# Patient Record
Sex: FEMALE | Race: WHITE | NOT HISPANIC OR LATINO | Employment: OTHER | URBAN - METROPOLITAN AREA
[De-identification: names, ages, dates, MRNs, and addresses within clinical notes are randomized per-mention and may not be internally consistent; named-entity substitution may affect disease eponyms.]

---

## 2017-09-13 DIAGNOSIS — M25.562 PAIN IN BOTH KNEES, UNSPECIFIED CHRONICITY: Primary | ICD-10-CM

## 2017-09-13 DIAGNOSIS — M25.561 PAIN IN BOTH KNEES, UNSPECIFIED CHRONICITY: Primary | ICD-10-CM

## 2017-09-18 ENCOUNTER — OFFICE VISIT (OUTPATIENT)
Dept: ORTHOPEDICS | Facility: CLINIC | Age: 72
End: 2017-09-18
Payer: MEDICARE

## 2017-09-18 VITALS
DIASTOLIC BLOOD PRESSURE: 80 MMHG | BODY MASS INDEX: 39.51 KG/M2 | HEIGHT: 63 IN | HEART RATE: 64 BPM | SYSTOLIC BLOOD PRESSURE: 135 MMHG | WEIGHT: 223 LBS

## 2017-09-18 DIAGNOSIS — M25.561 ACUTE PAIN OF RIGHT KNEE: Primary | ICD-10-CM

## 2017-09-18 PROCEDURE — 99203 OFFICE O/P NEW LOW 30 MIN: CPT | Mod: 25,S$GLB,, | Performed by: ORTHOPAEDIC SURGERY

## 2017-09-18 PROCEDURE — 1125F AMNT PAIN NOTED PAIN PRSNT: CPT | Mod: S$GLB,,, | Performed by: ORTHOPAEDIC SURGERY

## 2017-09-18 PROCEDURE — 20610 DRAIN/INJ JOINT/BURSA W/O US: CPT | Mod: RT,S$GLB,, | Performed by: ORTHOPAEDIC SURGERY

## 2017-09-18 PROCEDURE — 1159F MED LIST DOCD IN RCRD: CPT | Mod: S$GLB,,, | Performed by: ORTHOPAEDIC SURGERY

## 2017-09-18 RX ORDER — ASPIRIN 325 MG
325 TABLET ORAL DAILY
COMMUNITY
End: 2018-12-14

## 2017-09-18 RX ORDER — CHOLECALCIFEROL (VITAMIN D3) 125 MCG
5000 CAPSULE ORAL DAILY
COMMUNITY

## 2017-09-18 RX ORDER — TITANIUM DIOXIDE, OCTINOXATE, ZINC OXIDE 4.61; 1.6; .78 G/40ML; G/40ML; G/40ML
500 CREAM TOPICAL DAILY
COMMUNITY

## 2017-09-18 RX ORDER — HYDROCHLOROTHIAZIDE 12.5 MG/1
12.5 TABLET ORAL DAILY
COMMUNITY
End: 2018-12-14

## 2017-09-18 RX ORDER — CETIRIZINE HYDROCHLORIDE 10 MG/1
10 TABLET ORAL DAILY
COMMUNITY

## 2017-09-18 RX ORDER — POLYETHYLENE GLYCOL 3350 17 G/17G
17 POWDER, FOR SOLUTION ORAL EVERY OTHER DAY
COMMUNITY

## 2017-09-18 RX ORDER — NAPROXEN 500 MG/1
500 TABLET ORAL NIGHTLY
COMMUNITY

## 2017-09-18 RX ORDER — TRIAMCINOLONE ACETONIDE 40 MG/ML
40 INJECTION, SUSPENSION INTRA-ARTICULAR; INTRAMUSCULAR
Status: DISCONTINUED | OUTPATIENT
Start: 2017-09-18 | End: 2017-09-18 | Stop reason: HOSPADM

## 2017-09-18 RX ORDER — AMOXICILLIN 500 MG
2 CAPSULE ORAL 2 TIMES DAILY
COMMUNITY

## 2017-09-18 RX ORDER — PREDNISOLONE ACETATE 10 MG/ML
1 SUSPENSION/ DROPS OPHTHALMIC 4 TIMES DAILY
COMMUNITY
End: 2018-12-14

## 2017-09-18 RX ORDER — BUSPIRONE HYDROCHLORIDE 15 MG/1
7.5 TABLET ORAL DAILY
COMMUNITY

## 2017-09-18 RX ORDER — CHOLECALCIFEROL (VITAMIN D3) 25 MCG
2000 TABLET ORAL DAILY
COMMUNITY

## 2017-09-18 RX ORDER — GUAIFENESIN 400 MG/1
400 TABLET ORAL 2 TIMES DAILY
COMMUNITY
End: 2018-12-14

## 2017-09-18 RX ORDER — CYANOCOBALAMIN (VITAMIN B-12) 250 MCG
250 TABLET ORAL DAILY
COMMUNITY

## 2017-09-18 RX ADMIN — TRIAMCINOLONE ACETONIDE 40 MG: 40 INJECTION, SUSPENSION INTRA-ARTICULAR; INTRAMUSCULAR at 10:09

## 2017-09-18 NOTE — PROCEDURES
Large Joint Aspiration/Injection  Date/Time: 9/18/2017 10:48 AM  Performed by: CHRISTIANA BOCANEGRA  Authorized by: CHRISTIANA BOCANEGRA     Consent Done?:  Yes (Verbal)  Indications:  Pain  Timeout: Prior to procedure the correct patient, procedure, and site was verified      Location:  Knee  Site:  R knee  Prep: Patient was prepped and draped in usual sterile fashion    Approach:  Anteromedial  Medications:  40 mg triamcinolone acetonide 40 mg/mL

## 2017-09-18 NOTE — LETTER
September 18, 2017      [facility]  [address] [place] [address]           [place] - Orthopedics  [address] [place] [address]  Phone: [phone]  Fax: [phone]          Patient: Marielena Connors   MR Number: [id number]   YOB: 1945   Date of Visit: 9/18/2017       Dear [facility]:    Thank you for referring Marielena Connors to me for evaluation. Attached you will find relevant portions of my assessment and plan of care.    If you have questions, please do not hesitate to call me. I look forward to following Marielena Connors along with you.    Sincerely,    Marcos Drummond MD    Enclosure  CC:  No Recipients    If you would like to receive this communication electronically, please contact [email or web address] or [phone] to request more information on [facility] access.    For providers and/or their staff who would like to refer a patient to [facility], please contact us through our one-stop-shop provider referral line, [facility], at [phone].    If you feel you have received this communication in error or would no longer like to receive these types of communications, please e-mail [email or web address]

## 2017-09-18 NOTE — PROGRESS NOTES
Past Medical History:   Diagnosis Date    A-fib     Arthritis     Cancer     basal and squamous    Hypertension     Reflux esophagitis        Past Surgical History:   Procedure Laterality Date    cataract Bilateral     HAND SURGERY Bilateral 2006    carpal tunnel    KNEE ARTHROSCOPY Bilateral 2000    KNEE SURGERY Left 2008    partial left knee arthroplasty       Current Outpatient Prescriptions   Medication Sig    aspirin 325 MG tablet Take 325 mg by mouth once daily.    biotin 5,000 mcg TbDL Take 5,000 mcg by mouth once daily.    busPIRone (BUSPAR) 15 MG tablet Take 7.5 mg by mouth once daily.    cetirizine (ZYRTEC) 10 MG tablet Take 10 mg by mouth once daily.    cranberry 400 mg Cap Take 500 mg by mouth once daily.    cyanocobalamin (VITAMIN B-12) 250 MCG tablet Take 250 mcg by mouth once daily.    fish oil-omega-3 fatty acids 300-1,000 mg capsule Take 2 g by mouth 2 (two) times daily.    guaifenesin (HUMIBID E) 400 mg Tab Take 400 mg by mouth 2 (two) times daily.    hydrochlorothiazide (HYDRODIURIL) 12.5 MG Tab Take 12.5 mg by mouth once daily.    linaclotide (LINZESS) 145 mcg Cap capsule Take 145 mcg by mouth 2 (two) times daily as needed.    naproxen (NAPROSYN) 500 MG tablet Take 500 mg by mouth every evening.    polyethylene glycol (GLYCOLAX) 17 gram PwPk Take 17 g by mouth every other day.    prednisoLONE acetate (PRED FORTE) 1 % DrpS 1 drop 4 (four) times daily.    ranitidine (ZANTAC) 150 MG tablet Take 150 mg by mouth 2 (two) times daily.    vitamin D 1000 units Tab Take 2,000 Units by mouth once daily.     No current facility-administered medications for this visit.        Review of patient's allergies indicates:   Allergen Reactions    Hydrocodone        History reviewed. No pertinent family history.    Social History     Social History    Marital status:      Spouse name: N/A    Number of children: N/A    Years of education: N/A     Occupational History    Not on  "file.     Social History Main Topics    Smoking status: Never Smoker    Smokeless tobacco: Never Used    Alcohol use No    Drug use: No    Sexual activity: Not on file     Other Topics Concern    Not on file     Social History Narrative    No narrative on file       Chief Complaint:   Chief Complaint   Patient presents with    Right Knee - Pain       Consulting Physician: [facility]*    History of present illness:    This is a 72 y.o. year old female who complains of right knee pain that she presented 8 out of 10 for approximately 1 month.  She is not sure that she had an injury but states she might have stepped in a hole in her yard.  She localizes the pain to both sides of the knee and behind the knee.  She states is worse with activities.    Review of Systems:    Constitution: Denies chills, fever, and sweats.  HENT: Denies headaches or blurry vision.  Cardiovascular: Denies chest pain or irregular heart beat.  Respiratory: Denies cough or shortness of breath.  Gastrointestinal: Denies abdominal pain, nausea, or vomiting.  Musculoskeletal:  Denies muscle cramps.  Neurological: Denies dizziness or focal weakness.  Psychiatric/Behavioral: Normal mental status.  Hematologic/Lymphatic: Denies bleeding problem or easy bruising/bleeding.  Skin: Denies rash or suspicious lesions.    Examination:    Vital Signs:    Vitals:    09/18/17 0951   BP: 135/80   Pulse: 64   Weight: 101.2 kg (223 lb)   Height: 5' 3.25" (1.607 m)   PainSc:   8   PainLoc: Knee       Body mass index is 39.19 kg/m².    This a well-developed, well nourished patient in no acute distress.    Alert and oriented x 3 and cooperative to examination.       Physical Exam: Right Knee Exam    Gait   Antalgic    Skin  Rash:   None  Scars:   None    Inspection  Erythema:  None  Bruising:  None  Effusion:  None  Masses:  None  Lymphadenopathy: None    Range of Motion: 0 to 130°    Medial Joint : Yes  Lateral Joint Line " Tender: No    Patellofemoral Tenderness: Yes  Patellofemoral Crepitus: Yes    Lachman:  Normal  Anterior Drawer: Normal  Posterior Drawer: Normal    Nick's:  Negative  Apley's:  Negative    Varus Stress:  Stable  Valgus Stress:  Stable    Strength:  5/5    Pulses:  Palpable  Sensation:  Intact          Imaging: X-rays ordered and reviewed today personally of the right knee show mild-to-moderate degenerative changes consistent with arthritis.        Assessment: Acute pain of right knee  -     Large Joint Aspiration/Injection        Plan:  We will start with a steroid injection today and see whatever relief she gets from that.  We'll followed up with Euflexxa series.      DISCLAIMER: This note may have been dictated using voice recognition software and may contain grammatical errors.     NOTE: Consult report sent to referring provider via [employer] EMR.

## 2017-09-25 ENCOUNTER — TELEPHONE (OUTPATIENT)
Dept: ORTHOPEDICS | Facility: CLINIC | Age: 72
End: 2017-09-25

## 2017-09-25 NOTE — TELEPHONE ENCOUNTER
----- Message from Cayla Love sent at 9/25/2017 11:40 AM CDT -----  Contact: patient  Patient calling in regards to speaking with the Nurse. Her knee is very swollen and the steroid injection didn't work. She is still in pain. Please advise.  Call back  [phone]  Thanks!

## 2017-09-25 NOTE — TELEPHONE ENCOUNTER
Returned pt's call, advised per MD pain/swelling likely from her arthritis which she is schedule to begin Euflexxa treatment on next week.  Pt agreeable to keeping her appt next week to begin treatment.

## 2017-10-02 ENCOUNTER — OFFICE VISIT (OUTPATIENT)
Dept: ORTHOPEDICS | Facility: CLINIC | Age: 72
End: 2017-10-02
Payer: MEDICARE

## 2017-10-02 VITALS — BODY MASS INDEX: 39.54 KG/M2 | WEIGHT: 223.13 LBS | HEIGHT: 63 IN

## 2017-10-02 DIAGNOSIS — M25.561 ACUTE PAIN OF RIGHT KNEE: Primary | ICD-10-CM

## 2017-10-02 DIAGNOSIS — M17.11 ARTHRITIS OF KNEE, RIGHT: ICD-10-CM

## 2017-10-02 PROCEDURE — 99499 UNLISTED E&M SERVICE: CPT | Mod: S$GLB,,, | Performed by: ORTHOPAEDIC SURGERY

## 2017-10-02 PROCEDURE — 20610 DRAIN/INJ JOINT/BURSA W/O US: CPT | Mod: RT,S$GLB,, | Performed by: ORTHOPAEDIC SURGERY

## 2017-10-02 RX ADMIN — Medication 20 MG: at 05:10

## 2017-10-08 RX ORDER — HYALURONATE SODIUM 20 MG/2 ML
20 SYRINGE (ML) INTRAARTICULAR
Status: DISCONTINUED | OUTPATIENT
Start: 2017-10-02 | End: 2017-10-08 | Stop reason: HOSPADM

## 2017-10-08 NOTE — PROCEDURES
Large Joint Aspiration/Injection  Date/Time: 10/2/2017 5:37 PM  Performed by: CHRISTIANA BOCANEGRA  Authorized by: CHRISTIANA BOCANEGRA     Consent Done?:  Yes (Verbal)  Indications:  Pain  Timeout: Prior to procedure the correct patient, procedure, and site was verified      Location:  Knee  Site:  R knee  Prep: Patient was prepped and draped in usual sterile fashion    Approach:  Anteromedial  Medications:  20 mg EUFLEXXA 10 mg/mL(mw 2.4 -3.6 million)

## 2017-10-09 ENCOUNTER — OFFICE VISIT (OUTPATIENT)
Dept: ORTHOPEDICS | Facility: CLINIC | Age: 72
End: 2017-10-09
Payer: MEDICARE

## 2017-10-09 VITALS — HEIGHT: 63 IN | WEIGHT: 223.13 LBS | BODY MASS INDEX: 39.54 KG/M2

## 2017-10-09 DIAGNOSIS — M17.11 ARTHRITIS OF KNEE, RIGHT: Primary | ICD-10-CM

## 2017-10-09 PROCEDURE — 20610 DRAIN/INJ JOINT/BURSA W/O US: CPT | Mod: RT,S$GLB,, | Performed by: ORTHOPAEDIC SURGERY

## 2017-10-09 PROCEDURE — 99499 UNLISTED E&M SERVICE: CPT | Mod: S$GLB,,, | Performed by: ORTHOPAEDIC SURGERY

## 2017-10-09 RX ORDER — HYALURONATE SODIUM 20 MG/2 ML
20 SYRINGE (ML) INTRAARTICULAR
Status: DISCONTINUED | OUTPATIENT
Start: 2017-10-09 | End: 2017-10-09 | Stop reason: HOSPADM

## 2017-10-09 RX ADMIN — Medication 20 MG: at 09:10

## 2017-10-09 NOTE — PROCEDURES
Large Joint Aspiration/Injection  Date/Time: 10/9/2017 9:24 AM  Performed by: CHRISTIANA BOCANEGRA  Authorized by: CHRISTIANA BOCANEGRA     Consent Done?:  Yes (Verbal)  Indications:  Pain  Timeout: Prior to procedure the correct patient, procedure, and site was verified      Location:  Knee  Site:  R knee  Prep: Patient was prepped and draped in usual sterile fashion    Approach:  Anterolateral  Medications:  20 mg EUFLEXXA 10 mg/mL(mw 2.4 -3.6 million)

## 2017-10-09 NOTE — PROGRESS NOTES
Past Medical History:   Diagnosis Date    A-fib     Arthritis     Cancer     basal and squamous    Hypertension     Reflux esophagitis        Past Surgical History:   Procedure Laterality Date    cataract Bilateral     HAND SURGERY Bilateral 2006    carpal tunnel    KNEE ARTHROSCOPY Bilateral 2000    KNEE SURGERY Left 2008    partial left knee arthroplasty       Current Outpatient Prescriptions   Medication Sig    aspirin 325 MG tablet Take 325 mg by mouth once daily.    biotin 5,000 mcg TbDL Take 5,000 mcg by mouth once daily.    busPIRone (BUSPAR) 15 MG tablet Take 7.5 mg by mouth once daily.    cetirizine (ZYRTEC) 10 MG tablet Take 10 mg by mouth once daily.    cranberry 400 mg Cap Take 500 mg by mouth once daily.    cyanocobalamin (VITAMIN B-12) 250 MCG tablet Take 250 mcg by mouth once daily.    fish oil-omega-3 fatty acids 300-1,000 mg capsule Take 2 g by mouth 2 (two) times daily.    guaifenesin (HUMIBID E) 400 mg Tab Take 400 mg by mouth 2 (two) times daily.    hydrochlorothiazide (HYDRODIURIL) 12.5 MG Tab Take 12.5 mg by mouth once daily.    linaclotide (LINZESS) 145 mcg Cap capsule Take 145 mcg by mouth 2 (two) times daily as needed.    naproxen (NAPROSYN) 500 MG tablet Take 500 mg by mouth every evening.    polyethylene glycol (GLYCOLAX) 17 gram PwPk Take 17 g by mouth every other day.    prednisoLONE acetate (PRED FORTE) 1 % DrpS 1 drop 4 (four) times daily.    ranitidine (ZANTAC) 150 MG tablet Take 150 mg by mouth 2 (two) times daily.    vitamin D 1000 units Tab Take 2,000 Units by mouth once daily.     No current facility-administered medications for this visit.        Review of patient's allergies indicates:   Allergen Reactions    Hydrocodone        History reviewed. No pertinent family history.    Social History     Social History    Marital status:      Spouse name: N/A    Number of children: N/A    Years of education: N/A     Occupational History    Not on  "file.     Social History Main Topics    Smoking status: Never Smoker    Smokeless tobacco: Never Used    Alcohol use No    Drug use: No    Sexual activity: Not on file     Other Topics Concern    Not on file     Social History Narrative    No narrative on file       Chief Complaint:   Chief Complaint   Patient presents with    Injections     EUFLEXXA 2/3 RIGHT KNEE       Consulting Physician: No ref. provider found    History of present illness:    This is a 72 y.o. year old female who complains of right knee pain that she presented 8 out of 10 for approximately 1 month.  She is not sure that she had an injury but states she might have stepped in a hole in her yard.  She localizes the pain to both sides of the knee and behind the knee.  She states is worse with activities.    Review of Systems:    Constitution: Denies chills, fever, and sweats.  HENT: Denies headaches or blurry vision.  Cardiovascular: Denies chest pain or irregular heart beat.  Respiratory: Denies cough or shortness of breath.  Gastrointestinal: Denies abdominal pain, nausea, or vomiting.  Musculoskeletal:  Denies muscle cramps.  Neurological: Denies dizziness or focal weakness.  Psychiatric/Behavioral: Normal mental status.  Hematologic/Lymphatic: Denies bleeding problem or easy bruising/bleeding.  Skin: Denies rash or suspicious lesions.    Examination:    Vital Signs:    Vitals:    10/09/17 0856   Weight: 101.2 kg (223 lb 1.7 oz)   Height: 5' 3.25" (1.607 m)   PainSc:   6   PainLoc: Knee       Body mass index is 39.21 kg/m².    This a well-developed, well nourished patient in no acute distress.    Alert and oriented x 3 and cooperative to examination.       Physical Exam: Right Knee Exam    Gait   Antalgic    Skin  Rash:   None  Scars:   None    Inspection  Erythema:  None  Bruising:  None  Effusion:  None  Masses:  None  Lymphadenopathy: None    Range of Motion: 0 to 130°    Medial Joint : Yes  Lateral Joint Line " Tender: No    Patellofemoral Tenderness: Yes  Patellofemoral Crepitus: Yes    Lachman:  Normal  Anterior Drawer: Normal  Posterior Drawer: Normal    Nick's:  Negative  Apley's:  Negative    Varus Stress:  Stable  Valgus Stress:  Stable    Strength:  5/5    Pulses:  Palpable  Sensation:  Intact          Imaging: X-rays ordered and reviewed today personally of the right knee show mild-to-moderate degenerative changes consistent with arthritis.        Assessment: Arthritis of knee, right  -     Large Joint Aspiration/Injection        Plan:   Euflexxa series.      DISCLAIMER: This note may have been dictated using voice recognition software and may contain grammatical errors.     NOTE: Consult report sent to referring provider via [employer] EMR.

## 2017-10-16 ENCOUNTER — OFFICE VISIT (OUTPATIENT)
Dept: ORTHOPEDICS | Facility: CLINIC | Age: 72
End: 2017-10-16
Payer: MEDICARE

## 2017-10-16 VITALS — BODY MASS INDEX: 39.54 KG/M2 | WEIGHT: 223.13 LBS | HEIGHT: 63 IN

## 2017-10-16 DIAGNOSIS — M17.11 ARTHRITIS OF KNEE, RIGHT: Primary | ICD-10-CM

## 2017-10-16 PROCEDURE — 99499 UNLISTED E&M SERVICE: CPT | Mod: S$GLB,,, | Performed by: ORTHOPAEDIC SURGERY

## 2017-10-16 PROCEDURE — 20610 DRAIN/INJ JOINT/BURSA W/O US: CPT | Mod: RT,S$GLB,, | Performed by: ORTHOPAEDIC SURGERY

## 2017-10-16 RX ORDER — TRIAMCINOLONE ACETONIDE 40 MG/ML
40 INJECTION, SUSPENSION INTRA-ARTICULAR; INTRAMUSCULAR
Status: DISCONTINUED | OUTPATIENT
Start: 2017-10-16 | End: 2017-10-16 | Stop reason: HOSPADM

## 2017-10-16 RX ADMIN — TRIAMCINOLONE ACETONIDE 40 MG: 40 INJECTION, SUSPENSION INTRA-ARTICULAR; INTRAMUSCULAR at 10:10

## 2017-10-16 NOTE — PROGRESS NOTES
Past Medical History:   Diagnosis Date    A-fib     Arthritis     Cancer     basal and squamous    Hypertension     Reflux esophagitis        Past Surgical History:   Procedure Laterality Date    cataract Bilateral     HAND SURGERY Bilateral 2006    carpal tunnel    KNEE ARTHROSCOPY Bilateral 2000    KNEE SURGERY Left 2008    partial left knee arthroplasty       Current Outpatient Prescriptions   Medication Sig    aspirin 325 MG tablet Take 325 mg by mouth once daily.    biotin 5,000 mcg TbDL Take 5,000 mcg by mouth once daily.    busPIRone (BUSPAR) 15 MG tablet Take 7.5 mg by mouth once daily.    cetirizine (ZYRTEC) 10 MG tablet Take 10 mg by mouth once daily.    cranberry 400 mg Cap Take 500 mg by mouth once daily.    cyanocobalamin (VITAMIN B-12) 250 MCG tablet Take 250 mcg by mouth once daily.    fish oil-omega-3 fatty acids 300-1,000 mg capsule Take 2 g by mouth 2 (two) times daily.    guaifenesin (HUMIBID E) 400 mg Tab Take 400 mg by mouth 2 (two) times daily.    hydrochlorothiazide (HYDRODIURIL) 12.5 MG Tab Take 12.5 mg by mouth once daily.    linaclotide (LINZESS) 145 mcg Cap capsule Take 145 mcg by mouth 2 (two) times daily as needed.    naproxen (NAPROSYN) 500 MG tablet Take 500 mg by mouth every evening.    polyethylene glycol (GLYCOLAX) 17 gram PwPk Take 17 g by mouth every other day.    prednisoLONE acetate (PRED FORTE) 1 % DrpS 1 drop 4 (four) times daily.    ranitidine (ZANTAC) 150 MG tablet Take 150 mg by mouth 2 (two) times daily.    vitamin D 1000 units Tab Take 2,000 Units by mouth once daily.     No current facility-administered medications for this visit.        Review of patient's allergies indicates:   Allergen Reactions    Hydrocodone        History reviewed. No pertinent family history.    Social History     Social History    Marital status:      Spouse name: N/A    Number of children: N/A    Years of education: N/A     Occupational History    Not on  "file.     Social History Main Topics    Smoking status: Never Smoker    Smokeless tobacco: Never Used    Alcohol use No    Drug use: No    Sexual activity: Not on file     Other Topics Concern    Not on file     Social History Narrative    No narrative on file       Chief Complaint:   Chief Complaint   Patient presents with    Injections     EUFLEXXA 3/3 RIGHT KNEE       Consulting Physician: No ref. provider found    History of present illness:    This is a 72 y.o. year old female who complains of right knee pain that she presented 8 out of 10. Now 4/10.    Review of Systems:    Constitution: Denies chills, fever, and sweats.  HENT: Denies headaches or blurry vision.  Cardiovascular: Denies chest pain or irregular heart beat.  Respiratory: Denies cough or shortness of breath.  Gastrointestinal: Denies abdominal pain, nausea, or vomiting.  Musculoskeletal:  Denies muscle cramps.  Neurological: Denies dizziness or focal weakness.  Psychiatric/Behavioral: Normal mental status.  Hematologic/Lymphatic: Denies bleeding problem or easy bruising/bleeding.  Skin: Denies rash or suspicious lesions.    Examination:    Vital Signs:    Vitals:    10/16/17 0844   Weight: 101.2 kg (223 lb 1.7 oz)   Height: 5' 3.25" (1.607 m)   PainSc:   4   PainLoc: Knee       Body mass index is 39.21 kg/m².    This a well-developed, well nourished patient in no acute distress.    Alert and oriented x 3 and cooperative to examination.       Physical Exam: Right Knee Exam    Gait   Antalgic    Skin  Rash:   None  Scars:   None    Inspection  Erythema:  None  Bruising:  None  Effusion:  None  Masses:  None  Lymphadenopathy: None    Range of Motion: 0 to 130°    Medial Joint : Yes  Lateral Joint : No    Patellofemoral Tenderness: Yes  Patellofemoral Crepitus: Yes    Lachman:  Normal  Anterior Drawer: Normal  Posterior Drawer: Normal    Nick's:  Negative  Apley's:  Negative    Varus Stress:  Stable  Valgus " Stress:  Stable    Strength:  5/5    Pulses:  Palpable  Sensation:  Intact          Imaging: X-rays ordered and reviewed today personally of the right knee show mild-to-moderate degenerative changes consistent with arthritis.        Assessment: Arthritis of knee, right  -     Large Joint Aspiration/Injection        Plan:   Euflexxa series.      DISCLAIMER: This note may have been dictated using voice recognition software and may contain grammatical errors.     NOTE: Consult report sent to referring provider via [employer] EMR.

## 2017-10-16 NOTE — PROCEDURES
Large Joint Aspiration/Injection  Date/Time: 10/16/2017 10:08 AM  Performed by: CHRISTIANA BOCANEGRA  Authorized by: CHRISTIANA BOCANEGRA     Consent Done?:  Yes (Verbal)  Indications:  Pain  Timeout: Prior to procedure the correct patient, procedure, and site was verified      Location:  Knee  Site:  R knee  Prep: Patient was prepped and draped in usual sterile fashion    Approach:  Anteromedial  Medications:  40 mg triamcinolone acetonide 40 mg/mL

## 2018-04-13 ENCOUNTER — TELEPHONE (OUTPATIENT)
Dept: FAMILY MEDICINE | Facility: CLINIC | Age: 73
End: 2018-04-13

## 2018-04-13 DIAGNOSIS — E55.9 VITAMIN D DEFICIENCY: ICD-10-CM

## 2018-04-13 DIAGNOSIS — I10 BENIGN HYPERTENSION: Primary | ICD-10-CM

## 2018-04-13 NOTE — TELEPHONE ENCOUNTER
----- Message from Francisca Waddell sent at 4/13/2018  9:37 AM CDT -----  Type: Needs Medical Advice    Who Called: pt  Symptoms (please be specific):  na  How long has patient had these symptoms: na  Pharmacy name and phone #na  Best Call Back Number: [phone]  Additional Information  Would  Like to  Come  And  Pick   Lab orders  up

## 2018-12-14 ENCOUNTER — OFFICE VISIT (OUTPATIENT)
Dept: PODIATRY | Facility: CLINIC | Age: 73
End: 2018-12-14
Payer: MEDICARE

## 2018-12-14 VITALS
HEIGHT: 63 IN | WEIGHT: 220 LBS | HEART RATE: 68 BPM | SYSTOLIC BLOOD PRESSURE: 138 MMHG | TEMPERATURE: 97 F | BODY MASS INDEX: 38.98 KG/M2 | DIASTOLIC BLOOD PRESSURE: 81 MMHG

## 2018-12-14 DIAGNOSIS — L60.0 INGROWN NAIL OF GREAT TOE OF LEFT FOOT: Primary | ICD-10-CM

## 2018-12-14 DIAGNOSIS — B35.1 ONYCHOMYCOSIS DUE TO DERMATOPHYTE: ICD-10-CM

## 2018-12-14 DIAGNOSIS — M20.62 ACQUIRED DEFORMITY OF LEFT TOE: ICD-10-CM

## 2018-12-14 PROCEDURE — 99203 OFFICE O/P NEW LOW 30 MIN: CPT | Mod: S$PBB,,, | Performed by: PODIATRIST

## 2018-12-14 PROCEDURE — 99999 PR PBB SHADOW E&M-EST. PATIENT-LVL IV: CPT | Mod: PBBFAC,,, | Performed by: PODIATRIST

## 2018-12-14 PROCEDURE — 99214 OFFICE O/P EST MOD 30 MIN: CPT | Mod: PBBFAC | Performed by: PODIATRIST

## 2018-12-14 RX ORDER — CLOTRIMAZOLE 1 G/ML
SOLUTION TOPICAL 2 TIMES DAILY
Qty: 30 BOTTLE | Refills: 1 | Status: SHIPPED | OUTPATIENT
Start: 2018-12-14 | End: 2021-02-08

## 2018-12-14 RX ORDER — OMEPRAZOLE 10 MG/1
4 CAPSULE, DELAYED RELEASE ORAL DAILY
COMMUNITY

## 2018-12-14 RX ORDER — CYCLOBENZAPRINE HCL 10 MG
10 TABLET ORAL 3 TIMES DAILY PRN
COMMUNITY
End: 2021-02-08

## 2018-12-14 RX ORDER — FLECAINIDE ACETATE 50 MG/1
50 TABLET ORAL EVERY 12 HOURS
COMMUNITY

## 2018-12-14 RX ORDER — SUCRALFATE 1 G/1
1 TABLET ORAL 4 TIMES DAILY
COMMUNITY

## 2018-12-14 RX ORDER — HYDROCHLOROTHIAZIDE 12.5 MG/1
12.5 CAPSULE ORAL DAILY
COMMUNITY

## 2018-12-23 NOTE — PROGRESS NOTES
Subjective:      Patient ID: Marielena Connors is a 73 y.o. female.    Chief Complaint: Nail Problem (fungus); Ingrown Toenail; and Foot Problem  Patient presents with complaint of pain due to ingrown nail left great toe, crooked 2nd digit left foot   and possible fungus of nails 1st and 2nd digit right foot.  Relates ingrown nail has progressed quickly,   red and swollen, no drainage.  States she wears wide comfortable shoes, tries to avoid problems   with the left 2nd digit. Pain level 2/10    ROS     Constitutional    Pleasant, well-nourished (BMI 38), no distress, well oriented    Cardiovascular          AFIB, no chest pain, no shortness of breath    Respiratory           No cough, no congestion     Musculoskeletal          YES arthralgias/joint pain/ARTHRITIS    Neurologic         No weakness, no numbness     Psychiatric    No depression, no anxiety         Objective:      Physical Exam  Vascular         Arterial Pulses Right: posterior tibialis 2/4, dorsalis pedis 2/4, normal CFT   Arterial Pulses Left: posterior tibialis 2/4, dorsalis pedis 2/4, normal CFT   No lower extremity edema bilateral   Pedal skin temperature and color are normal bilateral     Integumentary     Painful ingrown nail lateral left hallux with localized erythema and edema, no calor or          drainage.  This is partially due to a medially deviated /contracted 2nd digit which           causes pressure on this area.    Evidence of onychomycosis 1st and 2nd digit nail right foot, no pain or evidence of             infection at this time    Skin is in good condition, thin, soft, no other complications bilateral feet          Neurological   Gross sensation intact, denies burning or tingling in feet    Musculoskeletal   Muscle Strength/Testing and Tone:  Intact, normal tone consistent with age bilateral   Joints, Bones, and Muscles: Limited  Range of motion, consistent with age in early              arthritic and degenerative changes bilateral  feet        Walks well unassisted          Assessment:       Encounter Diagnoses   Name Primary?    Ingrown nail of great toe of left foot Yes    Acquired deformity of left toe     Onychomycosis due to dermatophyte          Plan:       Marielena was seen today for nail problem, ingrown toenail and foot problem.    Diagnoses and all orders for this visit:    Ingrown nail of great toe of left foot    Acquired deformity of left toe    Onychomycosis due to dermatophyte    Other orders  -     clotrimazole (LOTRIMIN) 1 % Soln; Apply topically 2 (two) times daily.      Ingrown nail removed/debrided lateral left hallux.  Bacitracin, gauze, Coban applied.  Instructed   patient to leave dressing intact until the morning, if sore apply antibiotic ointment and a soft   dressing during the day, soak in warm water and Epsom salts in the evening. Instructed   patient to perform this treatment daily until any remaining redness, swelling or pain have   resolved.  Explained condition should be completely resolved and pain-free in a few days.    Instructed patient to contact the office with worsening symptoms or not pain-free in 1 week.  Discussed conservative treatments, padding, spacers, wide shoes to prevent recurrence due   to acquired deformity 2nd digit causing pressure on the nail.  Instructed patient to start above   conservative treatments immediately any time the area is red,  swollen or painful.  We discussed fungal involvement of 1st and 2nd digit nails right foot, better maintenance of nails.  Prescribed clotrimazole, apply once daily.   Advised patient she should notice improvement in 3   months,  at that point decrease application to 3 times a week, or every other day, typically takes   about 8-12 months to completely grow out clear nail if possible.  Counseled the patient on her conditions, their implications and medical management.  Instructed patient to contact the office with any changes, questions, concerns,  worsening of   symptoms.   Patient verbalized understanding.   Total face-to-face time, exam, assessment, treatment, discussion, documentation 30 minutes,   more than half this time spent on consultation and coordination of care.  Follow up as needed.    This note was created using [employer] voice recognition software that occasionally misinterpreted   phrases or words.

## 2021-01-13 ENCOUNTER — IMMUNIZATION (OUTPATIENT)
Dept: FAMILY MEDICINE | Facility: CLINIC | Age: 76
End: 2021-01-13
Payer: MEDICARE

## 2021-01-13 DIAGNOSIS — Z23 NEED FOR VACCINATION: ICD-10-CM

## 2021-01-13 PROCEDURE — 91301 COVID-19, MRNA, LNP-S, PF, 100 MCG/0.5 ML DOSE VACCINE: CPT | Mod: ,,, | Performed by: FAMILY MEDICINE

## 2021-01-13 PROCEDURE — 91301 COVID-19, MRNA, LNP-S, PF, 100 MCG/0.5 ML DOSE VACCINE: ICD-10-PCS | Mod: ,,, | Performed by: FAMILY MEDICINE

## 2021-01-13 PROCEDURE — 0011A COVID-19, MRNA, LNP-S, PF, 100 MCG/0.5 ML DOSE VACCINE: ICD-10-PCS | Mod: ,,, | Performed by: FAMILY MEDICINE

## 2021-01-13 PROCEDURE — 0011A COVID-19, MRNA, LNP-S, PF, 100 MCG/0.5 ML DOSE VACCINE: CPT | Mod: ,,, | Performed by: FAMILY MEDICINE

## 2021-02-08 ENCOUNTER — OFFICE VISIT (OUTPATIENT)
Dept: RHEUMATOLOGY | Facility: CLINIC | Age: 76
End: 2021-02-08
Payer: MEDICARE

## 2021-02-08 ENCOUNTER — LAB VISIT (OUTPATIENT)
Dept: LAB | Facility: HOSPITAL | Age: 76
End: 2021-02-08
Attending: INTERNAL MEDICINE
Payer: MEDICARE

## 2021-02-08 VITALS
DIASTOLIC BLOOD PRESSURE: 72 MMHG | SYSTOLIC BLOOD PRESSURE: 115 MMHG | BODY MASS INDEX: 29.67 KG/M2 | TEMPERATURE: 98 F | WEIGHT: 167.5 LBS

## 2021-02-08 DIAGNOSIS — R76.8 POSITIVE ANA (ANTINUCLEAR ANTIBODY): ICD-10-CM

## 2021-02-08 DIAGNOSIS — R63.4 WEIGHT DECREASE: ICD-10-CM

## 2021-02-08 DIAGNOSIS — R76.8 POSITIVE ANA (ANTINUCLEAR ANTIBODY): Primary | ICD-10-CM

## 2021-02-08 LAB
ALBUMIN SERPL BCP-MCNC: 4.1 G/DL (ref 3.5–5.2)
ALP SERPL-CCNC: 85 U/L (ref 55–135)
ALT SERPL W/O P-5'-P-CCNC: 11 U/L (ref 10–44)
ANION GAP SERPL CALC-SCNC: 8 MMOL/L (ref 8–16)
AST SERPL-CCNC: 14 U/L (ref 10–40)
BACTERIA #/AREA URNS HPF: NEGATIVE /HPF
BASOPHILS # BLD AUTO: 0.06 K/UL (ref 0–0.2)
BASOPHILS NFR BLD: 1.5 % (ref 0–1.9)
BILIRUB SERPL-MCNC: 1 MG/DL (ref 0.1–1)
BILIRUB UR QL STRIP: NEGATIVE
BUN SERPL-MCNC: 21 MG/DL (ref 8–23)
CALCIUM SERPL-MCNC: 9.2 MG/DL (ref 8.7–10.5)
CHLORIDE SERPL-SCNC: 100 MMOL/L (ref 95–110)
CLARITY UR: CLEAR
CO2 SERPL-SCNC: 28 MMOL/L (ref 23–29)
COLOR UR: YELLOW
CREAT SERPL-MCNC: 1 MG/DL (ref 0.5–1.4)
DIFFERENTIAL METHOD: ABNORMAL
EOSINOPHIL # BLD AUTO: 0.1 K/UL (ref 0–0.5)
EOSINOPHIL NFR BLD: 1.8 % (ref 0–8)
ERYTHROCYTE [DISTWIDTH] IN BLOOD BY AUTOMATED COUNT: 12.9 % (ref 11.5–14.5)
EST. GFR  (AFRICAN AMERICAN): >60 ML/MIN/1.73 M^2
EST. GFR  (NON AFRICAN AMERICAN): 54.5 ML/MIN/1.73 M^2
GLUCOSE SERPL-MCNC: 87 MG/DL (ref 70–110)
GLUCOSE UR QL STRIP: NEGATIVE
HCT VFR BLD AUTO: 34 % (ref 37–48.5)
HGB BLD-MCNC: 11.6 G/DL (ref 12–16)
HGB UR QL STRIP: NEGATIVE
HYALINE CASTS #/AREA URNS LPF: 3 /LPF
IMM GRANULOCYTES # BLD AUTO: 0.01 K/UL (ref 0–0.04)
IMM GRANULOCYTES NFR BLD AUTO: 0.3 % (ref 0–0.5)
KETONES UR QL STRIP: NEGATIVE
LEUKOCYTE ESTERASE UR QL STRIP: ABNORMAL
LYMPHOCYTES # BLD AUTO: 1.2 K/UL (ref 1–4.8)
LYMPHOCYTES NFR BLD: 29.4 % (ref 18–48)
MCH RBC QN AUTO: 35.9 PG (ref 27–31)
MCHC RBC AUTO-ENTMCNC: 34.1 G/DL (ref 32–36)
MCV RBC AUTO: 105 FL (ref 82–98)
MICROSCOPIC COMMENT: ABNORMAL
MONOCYTES # BLD AUTO: 0.3 K/UL (ref 0.3–1)
MONOCYTES NFR BLD: 7.4 % (ref 4–15)
NEUTROPHILS # BLD AUTO: 2.4 K/UL (ref 1.8–7.7)
NEUTROPHILS NFR BLD: 59.6 % (ref 38–73)
NITRITE UR QL STRIP: NEGATIVE
NRBC BLD-RTO: 0 /100 WBC
PH UR STRIP: 5 [PH] (ref 5–8)
PLATELET # BLD AUTO: 163 K/UL (ref 150–350)
PMV BLD AUTO: 12.6 FL (ref 9.2–12.9)
POTASSIUM SERPL-SCNC: 3.9 MMOL/L (ref 3.5–5.1)
PROT SERPL-MCNC: 6.9 G/DL (ref 6–8.4)
PROT UR QL STRIP: NEGATIVE
RBC # BLD AUTO: 3.23 M/UL (ref 4–5.4)
RBC #/AREA URNS HPF: 0 /HPF (ref 0–4)
SODIUM SERPL-SCNC: 136 MMOL/L (ref 136–145)
SP GR UR STRIP: 1.01 (ref 1–1.03)
SQUAMOUS #/AREA URNS HPF: 0 /HPF
TSH SERPL DL<=0.005 MIU/L-ACNC: 1.01 UIU/ML (ref 0.34–5.6)
URN SPEC COLLECT METH UR: ABNORMAL
UROBILINOGEN UR STRIP-ACNC: NEGATIVE EU/DL
WBC # BLD AUTO: 3.94 K/UL (ref 3.9–12.7)
WBC #/AREA URNS HPF: 0 /HPF (ref 0–5)

## 2021-02-08 PROCEDURE — 80053 COMPREHEN METABOLIC PANEL: CPT

## 2021-02-08 PROCEDURE — 86146 BETA-2 GLYCOPROTEIN ANTIBODY: CPT

## 2021-02-08 PROCEDURE — 81001 URINALYSIS AUTO W/SCOPE: CPT

## 2021-02-08 PROCEDURE — 86235 NUCLEAR ANTIGEN ANTIBODY: CPT | Mod: 59

## 2021-02-08 PROCEDURE — 84443 ASSAY THYROID STIM HORMONE: CPT

## 2021-02-08 PROCEDURE — 86160 COMPLEMENT ANTIGEN: CPT

## 2021-02-08 PROCEDURE — 86147 CARDIOLIPIN ANTIBODY EA IG: CPT | Mod: 59

## 2021-02-08 PROCEDURE — 99203 PR OFFICE/OUTPT VISIT, NEW, LEVL III, 30-44 MIN: ICD-10-PCS | Mod: S$GLB,,, | Performed by: INTERNAL MEDICINE

## 2021-02-08 PROCEDURE — 86800 THYROGLOBULIN ANTIBODY: CPT

## 2021-02-08 PROCEDURE — 86200 CCP ANTIBODY: CPT

## 2021-02-08 PROCEDURE — 36415 COLL VENOUS BLD VENIPUNCTURE: CPT

## 2021-02-08 PROCEDURE — 86160 COMPLEMENT ANTIGEN: CPT | Mod: 59

## 2021-02-08 PROCEDURE — 30000890 LABCORP MISCELLANEOUS TEST

## 2021-02-08 PROCEDURE — 99203 OFFICE O/P NEW LOW 30 MIN: CPT | Mod: S$GLB,,, | Performed by: INTERNAL MEDICINE

## 2021-02-08 PROCEDURE — 86431 RHEUMATOID FACTOR QUANT: CPT

## 2021-02-08 PROCEDURE — 86235 NUCLEAR ANTIGEN ANTIBODY: CPT

## 2021-02-08 PROCEDURE — 85025 COMPLETE CBC W/AUTO DIFF WBC: CPT

## 2021-02-08 PROCEDURE — 86038 ANTINUCLEAR ANTIBODIES: CPT

## 2021-02-08 PROCEDURE — 86225 DNA ANTIBODY NATIVE: CPT

## 2021-02-08 PROCEDURE — 85613 RUSSELL VIPER VENOM DILUTED: CPT

## 2021-02-08 PROCEDURE — 86376 MICROSOMAL ANTIBODY EACH: CPT

## 2021-02-08 RX ORDER — RIVAROXABAN 20 MG/1
TABLET, FILM COATED ORAL
COMMUNITY
Start: 2020-12-19

## 2021-02-08 RX ORDER — LINACLOTIDE 290 UG/1
CAPSULE, GELATIN COATED ORAL
COMMUNITY
Start: 2021-01-11

## 2021-02-08 RX ORDER — POTASSIUM CHLORIDE 750 MG/1
TABLET, EXTENDED RELEASE ORAL
COMMUNITY
Start: 2020-11-23

## 2021-02-08 RX ORDER — AMITRIPTYLINE HYDROCHLORIDE 25 MG/1
TABLET, FILM COATED ORAL
COMMUNITY
Start: 2020-12-19

## 2021-02-10 ENCOUNTER — IMMUNIZATION (OUTPATIENT)
Dept: FAMILY MEDICINE | Facility: CLINIC | Age: 76
End: 2021-02-10
Payer: MEDICARE

## 2021-02-10 DIAGNOSIS — Z23 NEED FOR VACCINATION: Primary | ICD-10-CM

## 2021-02-10 LAB
C3 SERPL-MCNC: 115 MG/DL (ref 82–167)
C4 SERPL-MCNC: 31 MG/DL (ref 12–38)
CENTROMERE B AB SER-ACNC: <0.2 AI (ref 0–0.9)
DSDNA AB SER-ACNC: 1 IU/ML (ref 0–9)
ENA RNP AB SER-ACNC: <0.2 AI (ref 0–0.9)
ENA SCL70 AB SER-ACNC: <0.2 AI (ref 0–0.9)
ENA SM AB SER-ACNC: <0.2 AI (ref 0–0.9)
ENA SS-A AB SER-ACNC: <0.2 AI (ref 0–0.9)
ENA SS-B AB SER-ACNC: <0.2 AI (ref 0–0.9)
RHEUMATOID FACT SERPL-ACNC: <10 IU/ML (ref 0–13.9)
THYROGLOB AB SERPL-ACNC: <1 IU/ML (ref 0–0.9)
THYROPEROXIDASE AB SERPL-ACNC: 17 IU/ML (ref 0–34)

## 2021-02-10 PROCEDURE — 91301 COVID-19, MRNA, LNP-S, PF, 100 MCG/0.5 ML DOSE VACCINE: CPT | Mod: S$GLB,,, | Performed by: FAMILY MEDICINE

## 2021-02-10 PROCEDURE — 0012A COVID-19, MRNA, LNP-S, PF, 100 MCG/0.5 ML DOSE VACCINE: CPT | Mod: CV19,S$GLB,, | Performed by: FAMILY MEDICINE

## 2021-02-10 PROCEDURE — 91301 COVID-19, MRNA, LNP-S, PF, 100 MCG/0.5 ML DOSE VACCINE: ICD-10-PCS | Mod: S$GLB,,, | Performed by: FAMILY MEDICINE

## 2021-02-10 PROCEDURE — 0012A COVID-19, MRNA, LNP-S, PF, 100 MCG/0.5 ML DOSE VACCINE: ICD-10-PCS | Mod: CV19,S$GLB,, | Performed by: FAMILY MEDICINE

## 2021-02-11 LAB
ANA TITR SER IF: NEGATIVE {TITER}
B2 GLYCOPROT1 IGA SER-ACNC: <9 GPI IGA UNITS (ref 0–25)
B2 GLYCOPROT1 IGG SER-ACNC: <9 GPI IGG UNITS (ref 0–20)
B2 GLYCOPROT1 IGM SER-ACNC: <9 GPI IGM UNITS (ref 0–32)
CARDIOLIPIN IGA SER IA-ACNC: 13 MPL U/ML (ref 0–12)
CARDIOLIPIN IGG SER IA-ACNC: <9 GPL U/ML (ref 0–14)
CARDIOLIPIN IGM SER IA-ACNC: <9 APL U/ML (ref 0–11)
CCP IGA+IGG SERPL IA-ACNC: 11 UNITS (ref 0–19)
LA 2 SCREEN W REFLEX-IMP: NORMAL
SCREEN APTT: 40.3 SEC (ref 0–51.9)
SCREEN DRVVT: 46.2 SEC (ref 0–47)

## 2021-02-17 LAB
LABCORP MISC TEST CODE: NORMAL
LABCORP MISC TEST NAME: NORMAL
LABCORP MISCELLANEOUS TEST: NORMAL

## 2021-03-02 ENCOUNTER — OFFICE VISIT (OUTPATIENT)
Dept: RHEUMATOLOGY | Facility: CLINIC | Age: 76
End: 2021-03-02
Payer: MEDICARE

## 2021-03-02 VITALS
SYSTOLIC BLOOD PRESSURE: 141 MMHG | WEIGHT: 166.5 LBS | BODY MASS INDEX: 29.49 KG/M2 | DIASTOLIC BLOOD PRESSURE: 88 MMHG | TEMPERATURE: 97 F

## 2021-03-02 DIAGNOSIS — M15.9 OSTEOARTHRITIS, GENERALIZED: Primary | ICD-10-CM

## 2021-03-02 PROCEDURE — 99213 PR OFFICE/OUTPT VISIT, EST, LEVL III, 20-29 MIN: ICD-10-PCS | Mod: S$GLB,,, | Performed by: INTERNAL MEDICINE

## 2021-03-02 PROCEDURE — 99213 OFFICE O/P EST LOW 20 MIN: CPT | Mod: S$GLB,,, | Performed by: INTERNAL MEDICINE
